# Patient Record
Sex: FEMALE | Race: BLACK OR AFRICAN AMERICAN
[De-identification: names, ages, dates, MRNs, and addresses within clinical notes are randomized per-mention and may not be internally consistent; named-entity substitution may affect disease eponyms.]

---

## 2020-08-12 ENCOUNTER — HOSPITAL ENCOUNTER (EMERGENCY)
Dept: HOSPITAL 62 - ER | Age: 30
Discharge: HOME | End: 2020-08-12
Payer: MEDICAID

## 2020-08-12 VITALS — DIASTOLIC BLOOD PRESSURE: 75 MMHG | SYSTOLIC BLOOD PRESSURE: 123 MMHG

## 2020-08-12 DIAGNOSIS — R51: Primary | ICD-10-CM

## 2020-08-12 DIAGNOSIS — J45.909: ICD-10-CM

## 2020-08-12 PROCEDURE — 99284 EMERGENCY DEPT VISIT MOD MDM: CPT

## 2020-08-12 PROCEDURE — 96365 THER/PROPH/DIAG IV INF INIT: CPT

## 2020-08-12 PROCEDURE — 96375 TX/PRO/DX INJ NEW DRUG ADDON: CPT

## 2020-08-12 NOTE — ER DOCUMENT REPORT
ED Headache





- General


Chief Complaint: Headache


Stated Complaint: HEADACHE


Time Seen by Provider: 08/12/20 20:02


Notes: 


Patient is a 30-year-old female that comes emergency department for chief 

complaint of a headache that started earlier at work.  She states it started 

mild and then progressively worsened over the past couple of hours to now a 

throbbing headache where she has difficulty focusing.  Pain is mainly on the 

left back part of her head and around the side of her head.  She denies head 

injury, fever, focal numbness or weakness.  She denies getting frequent 

headaches.  She does report some tightness in her neck recently but has been 

really paying attention to it.  She denies vomiting, nausea, visual changes, 

focal numbness or weakness.  She takes no daily medications other than as needed

inhaler, no other reported medical history other than asthma.








Past Medical History





- General


Information source: Patient





- Social History


Smoking Status: Never Smoker


Frequency of alcohol use: None


Drug Abuse: None


Lives with: Family


Family History: Reviewed & Not Pertinent


Patient has homicidal ideation: No





- Immunizations


Immunizations up to date: Yes


Hx Diphtheria, Pertussis, Tetanus Vaccination: Yes





Review of Systems





- Review of Systems


Constitutional: No symptoms reported


EENT: No symptoms reported


Cardiovascular: No symptoms reported


Respiratory: No symptoms reported


Gastrointestinal: No symptoms reported


Genitourinary: No symptoms reported


Female Genitourinary: No symptoms reported


Musculoskeletal: No symptoms reported


Skin: No symptoms reported


Hematologic/Lymphatic: No symptoms reported


Neurological/Psychological: See HPI





Physical Exam





- Vital signs


Vitals: 


                                        











Temp Pulse Resp BP Pulse Ox


 


 98.7 F   89   18   123/75   97 


 


 08/12/20 20:05  08/12/20 20:05  08/12/20 20:05  08/12/20 20:05  08/12/20 20:05














- Notes


Notes: 





GENERAL: Alert, interacts well. No acute distress.


HEAD: Normocephalic, atraumatic.


EYES: Pupils equal, round, and reactive to light. Extraocular movements intact.


ENT: Oral mucosa moist, tongue midline. Oropharynx unremarkable. Airway patent. 

Nares patent, sinuses non-tender, ear canals unremarkable, TM's intact.


NECK: Full range of motion. Supple. Trachea midline. No lymphadenopathy.


LUNGS: Clear to auscultation bilaterally, no wheezes, rales, or rhonchi. No 

respiratory distress. Non-tender chest wall. 


HEART: Regular rate and rhythm. No murmur


EXTREMITIES: Moves all 4 extremities spontaneously. No edema, normal radial and 

dorsalis pedis pulses bilaterally. No cyanosis.


BACK: Tender along the paracervical musculature on both sides but more 

noticeably on the right.  Pain with lateral rotation of the neck but otherwise 

unremarkable.  No cervical, thoracic, lumbar midline tenderness. No saddle 

anesthesia, normal distal neurovascular exam. Moves all extremities in full 

range of motion.


NEUROLOGICAL: Alert and oriented x3. Normal speech. Cranial nerves II through 

XII grossly intact. Strength 5/5 in all extremities. 


PSYCH: Normal affect, normal mood.


SKIN: Warm, dry, normal turgor. No rashes or lesions noted.





Course





- Re-evaluation


Re-evalutation: 


Patient is well-appearing, she has tenderness along her paracervical muscles 

especially on the right, she has pain with lateral rotation of the head but no 

nuchal rigidity.  No neurological deficits.  Headache was not maximal at onset 

and was slowly progressive.  Patient is not had any vomiting or fever.  Clinical

presentation is most suggestive of a tension headache with developing migraine. 

We will attempt symptom management first and reassess.





Under evaluation patient is awake, states she feels great, headache is gone.  

Very low suspicion of intracranial emergency based on this.  Patient was 

provided with symptom management, instructions, follow-up, and I discussed 

return precautions and primary care follow-up.  Patient states appreciation and 

agreement.  Stable and well-appearing at time of discharge.








- Vital Signs


Vital signs: 


                                        











Temp Pulse Resp BP Pulse Ox


 


 98.7 F   89   18   123/75   97 


 


 08/12/20 20:05  08/12/20 20:05  08/12/20 20:05  08/12/20 20:05  08/12/20 20:05














Discharge





- Discharge


Clinical Impression: 


Headache


Qualifiers:


 Headache type: unspecified Headache chronicity pattern: acute headache 

Intractability: not intractable Qualified Code(s): R51 - Headache





Condition: Stable


Disposition: HOME, SELF-CARE


Additional Instructions: 


Your evaluation, symptoms, and resolution with treatment are very suggestive of 

a tension headache triggering a migraine.


Recommend the prescribed muscle x-ray, massage and heat to your neck, 

over-the-counter anti-inflammatories.  


You can take the Fioricet as needed for headaches additionally.


Follow-up with primary care for additional evaluation and management of 

migraines.


Return if you worsen including returned or severe headache, vomiting, fever, or 

any other concerning or worsening symptoms.


Prescriptions: 


Butalb/Acetaminophen/Caffeine [Fioricet (-40 mg) Tablet] 1 tab PO Q4HP PRN

#20 tab


 PRN Reason: 


Methocarbamol [Robaxin-750] 750 mg PO QID PRN #20 tablet


 PRN Reason: 


Forms:  Return to Work